# Patient Record
Sex: MALE | Race: WHITE | NOT HISPANIC OR LATINO | Employment: PART TIME | URBAN - METROPOLITAN AREA
[De-identification: names, ages, dates, MRNs, and addresses within clinical notes are randomized per-mention and may not be internally consistent; named-entity substitution may affect disease eponyms.]

---
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
## 2022-06-04 ENCOUNTER — HOSPITAL ENCOUNTER (EMERGENCY)
Facility: HOSPITAL | Age: 22
Discharge: HOME | End: 2022-06-04
Attending: EMERGENCY MEDICINE
Payer: COMMERCIAL

## 2022-06-04 VITALS
DIASTOLIC BLOOD PRESSURE: 78 MMHG | SYSTOLIC BLOOD PRESSURE: 137 MMHG | TEMPERATURE: 98.4 F | WEIGHT: 137.13 LBS | HEART RATE: 99 BPM | RESPIRATION RATE: 22 BRPM | BODY MASS INDEX: 23.54 KG/M2 | OXYGEN SATURATION: 98 %

## 2022-06-04 DIAGNOSIS — F11.10 OPIOID ABUSE (HCC): Primary | ICD-10-CM

## 2022-06-04 PROCEDURE — 99284 EMERGENCY DEPT VISIT MOD MDM: CPT | Performed by: EMERGENCY MEDICINE

## 2022-06-04 PROCEDURE — 99282 EMERGENCY DEPT VISIT SF MDM: CPT

## 2022-06-04 RX ORDER — HYDROXYZINE HYDROCHLORIDE 25 MG/1
25 TABLET, FILM COATED ORAL ONCE
Status: COMPLETED | OUTPATIENT
Start: 2022-06-04 | End: 2022-06-04

## 2022-06-04 RX ORDER — CLONIDINE HYDROCHLORIDE 0.1 MG/1
0.1 TABLET ORAL ONCE
Status: COMPLETED | OUTPATIENT
Start: 2022-06-04 | End: 2022-06-04

## 2022-06-04 RX ORDER — HYDROXYZINE HYDROCHLORIDE 25 MG/1
25 TABLET, FILM COATED ORAL EVERY 6 HOURS PRN
Qty: 12 TABLET | Refills: 0 | Status: SHIPPED | OUTPATIENT
Start: 2022-06-04 | End: 2022-06-04 | Stop reason: SDUPTHER

## 2022-06-04 RX ORDER — CLONIDINE HYDROCHLORIDE 0.2 MG/1
0.1 TABLET ORAL 3 TIMES DAILY PRN
Qty: 6 TABLET | Refills: 0 | Status: SHIPPED | OUTPATIENT
Start: 2022-06-04

## 2022-06-04 RX ORDER — HYDROXYZINE HYDROCHLORIDE 25 MG/1
25 TABLET, FILM COATED ORAL EVERY 6 HOURS PRN
Qty: 12 TABLET | Refills: 0 | Status: SHIPPED | OUTPATIENT
Start: 2022-06-04

## 2022-06-04 RX ORDER — CLONIDINE HYDROCHLORIDE 0.2 MG/1
0.1 TABLET ORAL 3 TIMES DAILY PRN
Qty: 6 TABLET | Refills: 0 | Status: SHIPPED | OUTPATIENT
Start: 2022-06-04 | End: 2022-06-04 | Stop reason: SDUPTHER

## 2022-06-04 RX ADMIN — CLONIDINE HYDROCHLORIDE 0.1 MG: 0.1 TABLET ORAL at 16:13

## 2022-06-04 RX ADMIN — HYDROXYZINE HYDROCHLORIDE 25 MG: 25 TABLET, FILM COATED ORAL at 16:13

## 2022-06-04 NOTE — ED PROVIDER NOTES
History  Chief Complaint   Patient presents with    Detox Evaluation     Pt arrives ambulatory " here for detox " " using heroin, fentanyl, maybe cocaine, marijuana " last use- last night   denies SI/ HI      25 yo male with a history of polysubstance abuse and anxiety presents to the ED requesting detox from fentanyl  The patient says he uses 8-10 bags every day x 1-2 months, mostly injected  (+) Occasional cocaine and marijuana abuse  He last used last night around 2300  The patient would like admission to the [facility] Detox Unit "to get off this stuff"  No current withdrawal symptoms  He denies SI, HI, and hallucinations  No recent alcohol use  He was last admitted to a rehab facility about 3 years ago  No other specific complaints  Prior to Admission Medications   Prescriptions Last Dose Informant Patient Reported? Taking? ARIPiprazole (ABILIFY) 2 mg tablet   Yes No   Sig: Take 2 mg by mouth daily   FLUoxetine (PROzac) 40 MG capsule   Yes No   Sig: Take 80 mg by mouth daily   Multiple Vitamins-Minerals (MULTIVITAMIN GUMMIES ADULT PO)   Yes No   Sig: Take 2 Doses by mouth daily      Facility-Administered Medications: None       Past Medical History:   Diagnosis Date    Psychiatric disorder     anxiety-takes prozac       Past Surgical History:   Procedure Laterality Date    SHOULDER BIOPSY      age 7 right shoulder biopsy/growth removal       History reviewed  No pertinent family history  I have reviewed and agree with the history as documented  E-Cigarette/Vaping     E-Cigarette/Vaping Substances     Social History     Tobacco Use    Smoking status: Current Every Day Smoker     Types: E-Cigarettes    Smokeless tobacco: Never Used    Tobacco comment: daily: e-cigarettes cartridge 1 per 2 days   Substance Use Topics    Alcohol use: No    Drug use: Yes     Types: Marijuana, Cocaine, Heroin, Fentanyl       Review of Systems   Constitutional: Negative for chills and fever     HENT: Negative for sore throat  Respiratory: Negative for cough and shortness of breath  Cardiovascular: Negative for chest pain and palpitations  Gastrointestinal: Negative for abdominal pain, diarrhea, nausea and vomiting  Endocrine: Negative for cold intolerance and heat intolerance  Genitourinary: Negative for dysuria and flank pain  Musculoskeletal: Negative for back pain  Skin: Negative for rash  Allergic/Immunologic: Negative for immunocompromised state  Neurological: Negative for tremors and headaches  Hematological: Negative for adenopathy  Psychiatric/Behavioral: Negative for suicidal ideas  The patient is not nervous/anxious  Physical Exam  Physical Exam  Constitutional:       General: He is not in acute distress  Appearance: He is well-developed  HENT:      Head: Normocephalic and atraumatic  Eyes:      Pupils: Pupils are equal, round, and reactive to light  Cardiovascular:      Rate and Rhythm: Normal rate and regular rhythm  Pulmonary:      Effort: Pulmonary effort is normal  No respiratory distress  Breath sounds: Normal breath sounds  Abdominal:      General: There is no distension  Palpations: Abdomen is soft  Tenderness: There is no abdominal tenderness  Musculoskeletal:         General: Normal range of motion  Cervical back: Normal range of motion and neck supple  Skin:     General: Skin is warm and dry  Neurological:      General: No focal deficit present  Mental Status: He is alert and oriented to person, place, and time           Vital Signs  ED Triage Vitals [06/04/22 1523]   Temperature Pulse Respirations Blood Pressure SpO2   98 4 °F (36 9 °C) 99 22 156/86 98 %      Temp Source Heart Rate Source Patient Position - Orthostatic VS BP Location FiO2 (%)   Oral Monitor Sitting Left arm --      Pain Score       --           Vitals:    06/04/22 1523 06/04/22 1613   BP: 156/86 137/78   Pulse: 99    Patient Position - Orthostatic VS: Sitting Visual Acuity      ED Medications  Medications   cloNIDine (CATAPRES) tablet 0 1 mg (0 1 mg Oral Given 6/4/22 1613)   hydrOXYzine HCL (ATARAX) tablet 25 mg (25 mg Oral Given 6/4/22 1613)       Diagnostic Studies  Results Reviewed     None                 No orders to display              Procedures  Procedures         ED Course                                             MDM  Number of Diagnoses or Management Options  Opioid abuse ([facility] [address] )  Diagnosis management comments: The patient is very comfortable appearing with stable vital signs and a benign exam  No current signs of withdrawal  He is requesting admission to the Detox Unit  Case discussed with the Detox provider  The unit is currently closed and the current provider does not anticipate any beds opening up until tomorrow morning at the earliest  This information was relayed to the patient  He declined HOST evaluation and says he will just "come back tomorrow"  Scripts for clonidine and atarax called into his pharmacy per request  The patient was given the HOST contact information in case he changes his mind about the service  Strict return precautions provided  Patient Progress  Patient progress: stable      Disposition  Final diagnoses:   Opioid abuse ([facility] [address] )     Time reflects when diagnosis was documented in both MDM as applicable and the Disposition within this note     Time User Action Codes Description Comment    6/4/2022  3:55 PM Lorraine Zelaya Add [F11 10] Opioid abuse [facility])       ED Disposition     ED Disposition   Discharge    Condition   Stable    Date/Time   Sat Jun 4, 2022  3:52 PM    Comment   Silvia Guerrero discharge to home/self care                 Follow-up Information     Follow up With Specialties Details Why Allison Gauthier MD Pediatrics Schedule an appointment as soon as possible for a visit   Senthil Almanzar   [address] [facility]  [phone]            Discharge Medication List as of 6/4/2022  4:03 PM      START taking these medications    Details   cloNIDine (CATAPRES) 0 2 mg tablet Take 0 5 tablets (0 1 mg total) by mouth 3 (three) times a day as needed (withdrawal symptoms), Starting Sat 6/4/2022, Normal      hydrOXYzine HCL (ATARAX) 25 mg tablet Take 1 tablet (25 mg total) by mouth every 6 (six) hours as needed for anxiety, Starting Sat 6/4/2022, Normal         CONTINUE these medications which have NOT CHANGED    Details   ARIPiprazole (ABILIFY) 2 mg tablet Take 2 mg by mouth daily, Historical Med      FLUoxetine (PROzac) 40 MG capsule Take 80 mg by mouth daily, Historical Med      Multiple Vitamins-Minerals (MULTIVITAMIN GUMMIES ADULT PO) Take 2 Doses by mouth daily, Historical Med             No discharge procedures on file      PDMP Review     None          ED Provider  Electronically Signed by           Ellen Mcclelland MD  06/04/22 3298

## 2024-11-18 ENCOUNTER — APPOINTMENT (OUTPATIENT)
Dept: URGENT CARE | Facility: CLINIC | Age: 24
End: 2024-11-18